# Patient Record
Sex: FEMALE | Employment: STUDENT | URBAN - METROPOLITAN AREA
[De-identification: names, ages, dates, MRNs, and addresses within clinical notes are randomized per-mention and may not be internally consistent; named-entity substitution may affect disease eponyms.]

---
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
## 2025-06-12 ENCOUNTER — OFFICE VISIT (OUTPATIENT)
Dept: FAMILY MEDICINE CLINIC | Facility: CLINIC | Age: 20
End: 2025-06-12
Payer: COMMERCIAL

## 2025-06-12 DIAGNOSIS — Z11.1 SCREENING FOR TUBERCULOSIS: Primary | ICD-10-CM

## 2025-06-12 LAB — INDURATION (): 0 MM (ref 0–11)

## 2025-06-12 PROCEDURE — 86580 TB INTRADERMAL TEST: CPT | Performed by: NURSE PRACTITIONER

## 2025-06-14 ENCOUNTER — OFFICE VISIT (OUTPATIENT)
Dept: FAMILY MEDICINE CLINIC | Facility: CLINIC | Age: 20
End: 2025-06-14
Payer: COMMERCIAL

## 2025-06-14 DIAGNOSIS — Z11.1 ENCOUNTER FOR PPD SKIN TEST READING: Primary | ICD-10-CM

## 2025-06-27 ENCOUNTER — OFFICE VISIT (OUTPATIENT)
Dept: FAMILY MEDICINE CLINIC | Facility: CLINIC | Age: 20
End: 2025-06-27
Payer: COMMERCIAL

## 2025-06-27 DIAGNOSIS — Z11.1 SCREENING FOR TUBERCULOSIS: Primary | ICD-10-CM

## 2025-06-27 LAB — INDURATION (): 0 MM (ref 0–11)

## 2025-06-27 PROCEDURE — 86580 TB INTRADERMAL TEST: CPT | Performed by: NURSE PRACTITIONER

## 2025-06-29 ENCOUNTER — OFFICE VISIT (OUTPATIENT)
Dept: FAMILY MEDICINE CLINIC | Facility: CLINIC | Age: 20
End: 2025-06-29
Payer: COMMERCIAL

## 2025-06-29 DIAGNOSIS — Z11.1 ENCOUNTER FOR PPD SKIN TEST READING: Primary | ICD-10-CM

## 2025-07-03 ENCOUNTER — HOSPITAL ENCOUNTER (EMERGENCY)
Age: 20
Discharge: HOME | End: 2025-07-04
Attending: EMERGENCY MEDICINE
Payer: COMMERCIAL

## 2025-07-03 ENCOUNTER — APPOINTMENT (OUTPATIENT)
Dept: CT IMAGING | Age: 20
End: 2025-07-03
Attending: EMERGENCY MEDICINE
Payer: COMMERCIAL

## 2025-07-03 ENCOUNTER — OFFICE VISIT (OUTPATIENT)
Dept: FAMILY MEDICINE CLINIC | Facility: CLINIC | Age: 20
End: 2025-07-03
Payer: COMMERCIAL

## 2025-07-03 VITALS
BODY MASS INDEX: 23.16 KG/M2 | SYSTOLIC BLOOD PRESSURE: 122 MMHG | WEIGHT: 118 LBS | HEIGHT: 60 IN | RESPIRATION RATE: 16 BRPM | TEMPERATURE: 98 F | DIASTOLIC BLOOD PRESSURE: 88 MMHG | HEART RATE: 91 BPM | OXYGEN SATURATION: 98 %

## 2025-07-03 DIAGNOSIS — R39.9 URINARY SYMPTOM OR SIGN: ICD-10-CM

## 2025-07-03 DIAGNOSIS — N83.209 OVARIAN CYST RUPTURE: Primary | ICD-10-CM

## 2025-07-03 DIAGNOSIS — N30.01 ACUTE CYSTITIS WITH HEMATURIA: Primary | ICD-10-CM

## 2025-07-03 LAB
ALBUMIN SERPL-MCNC: 4.7 G/DL (ref 3.2–4.8)
ALBUMIN/GLOB SERPL: 1.7 {RATIO} (ref 1–2)
ALP LIVER SERPL-CCNC: 98 U/L (ref 52–144)
ALT SERPL-CCNC: 11 U/L (ref 10–49)
ANION GAP SERPL CALC-SCNC: 7 MMOL/L (ref 0–18)
AST SERPL-CCNC: 15 U/L (ref ?–34)
BASOPHILS # BLD AUTO: 0.02 X10(3) UL (ref 0–0.2)
BASOPHILS NFR BLD AUTO: 0.3 %
BILIRUB SERPL-MCNC: 0.2 MG/DL (ref 0.3–1.2)
BILIRUB UR QL STRIP.AUTO: NEGATIVE
BILIRUBIN: NEGATIVE
BUN BLD-MCNC: 10 MG/DL (ref 9–23)
CALCIUM BLD-MCNC: 9.7 MG/DL (ref 8.7–10.6)
CHLORIDE SERPL-SCNC: 105 MMOL/L (ref 98–112)
CLARITY UR REFRACT.AUTO: CLEAR
CO2 SERPL-SCNC: 26 MMOL/L (ref 21–32)
COLOR UR AUTO: YELLOW
CREAT BLD-MCNC: 0.67 MG/DL (ref 0.55–1.02)
EGFRCR SERPLBLD CKD-EPI 2021: 128 ML/MIN/1.73M2 (ref 60–?)
EOSINOPHIL # BLD AUTO: 0.09 X10(3) UL (ref 0–0.7)
EOSINOPHIL NFR BLD AUTO: 1.2 %
ERYTHROCYTE [DISTWIDTH] IN BLOOD BY AUTOMATED COUNT: 12 %
GLOBULIN PLAS-MCNC: 2.8 G/DL (ref 2–3.5)
GLUCOSE (URINE DIPSTICK): NEGATIVE MG/DL
GLUCOSE BLD-MCNC: 86 MG/DL (ref 70–99)
GLUCOSE UR STRIP.AUTO-MCNC: NEGATIVE MG/DL
HCT VFR BLD AUTO: 34.2 % (ref 35–48)
HGB BLD-MCNC: 11.8 G/DL (ref 12–16)
IMM GRANULOCYTES # BLD AUTO: 0.04 X10(3) UL (ref 0–1)
IMM GRANULOCYTES NFR BLD: 0.5 %
KETONES (URINE DIPSTICK): NEGATIVE MG/DL
KETONES UR STRIP.AUTO-MCNC: NEGATIVE MG/DL
LYMPHOCYTES # BLD AUTO: 2.3 X10(3) UL (ref 1–4)
LYMPHOCYTES NFR BLD AUTO: 31 %
MCH RBC QN AUTO: 29.7 PG (ref 26–34)
MCHC RBC AUTO-ENTMCNC: 34.5 G/DL (ref 31–37)
MCV RBC AUTO: 86.1 FL (ref 80–100)
MONOCYTES # BLD AUTO: 0.72 X10(3) UL (ref 0.1–1)
MONOCYTES NFR BLD AUTO: 9.7 %
MULTISTIX LOT#: ABNORMAL NUMERIC
NEUTROPHILS # BLD AUTO: 4.24 X10 (3) UL (ref 1.5–7.7)
NEUTROPHILS # BLD AUTO: 4.24 X10(3) UL (ref 1.5–7.7)
NEUTROPHILS NFR BLD AUTO: 57.3 %
NITRITE UR QL STRIP.AUTO: NEGATIVE
NITRITE, URINE: NEGATIVE
OSMOLALITY SERPL CALC.SUM OF ELEC: 284 MOSM/KG (ref 275–295)
PH UR STRIP.AUTO: 7 [PH] (ref 5–8)
PH, URINE: 7 (ref 4.5–8)
PLATELET # BLD AUTO: 350 10(3)UL (ref 150–450)
POTASSIUM SERPL-SCNC: 4.4 MMOL/L (ref 3.5–5.1)
PROT SERPL-MCNC: 7.5 G/DL (ref 5.7–8.2)
PROT UR STRIP.AUTO-MCNC: NEGATIVE MG/DL
PROTEIN (URINE DIPSTICK): 30 MG/DL
RBC # BLD AUTO: 3.97 X10(6)UL (ref 3.8–5.3)
SODIUM SERPL-SCNC: 138 MMOL/L (ref 136–145)
SP GR UR STRIP.AUTO: 1.02 (ref 1–1.03)
SPECIFIC GRAVITY: 1.02 (ref 1–1.03)
URINE-COLOR: YELLOW
UROBILINOGEN UR STRIP.AUTO-MCNC: 0.2 MG/DL
UROBILINOGEN,SEMI-QN: 1 MG/DL (ref 0–1.9)
WBC # BLD AUTO: 7.4 X10(3) UL (ref 4–11)

## 2025-07-03 PROCEDURE — 99213 OFFICE O/P EST LOW 20 MIN: CPT | Performed by: NURSE PRACTITIONER

## 2025-07-03 PROCEDURE — 87086 URINE CULTURE/COLONY COUNT: CPT

## 2025-07-03 PROCEDURE — 87077 CULTURE AEROBIC IDENTIFY: CPT | Performed by: EMERGENCY MEDICINE

## 2025-07-03 PROCEDURE — 85025 COMPLETE CBC W/AUTO DIFF WBC: CPT

## 2025-07-03 PROCEDURE — 81001 URINALYSIS AUTO W/SCOPE: CPT

## 2025-07-03 PROCEDURE — 84702 CHORIONIC GONADOTROPIN TEST: CPT | Performed by: EMERGENCY MEDICINE

## 2025-07-03 PROCEDURE — 3074F SYST BP LT 130 MM HG: CPT | Performed by: NURSE PRACTITIONER

## 2025-07-03 PROCEDURE — 99284 EMERGENCY DEPT VISIT MOD MDM: CPT

## 2025-07-03 PROCEDURE — 96374 THER/PROPH/DIAG INJ IV PUSH: CPT

## 2025-07-03 PROCEDURE — 87086 URINE CULTURE/COLONY COUNT: CPT | Performed by: NURSE PRACTITIONER

## 2025-07-03 PROCEDURE — 87086 URINE CULTURE/COLONY COUNT: CPT | Performed by: EMERGENCY MEDICINE

## 2025-07-03 PROCEDURE — 3079F DIAST BP 80-89 MM HG: CPT | Performed by: NURSE PRACTITIONER

## 2025-07-03 PROCEDURE — 81003 URINALYSIS AUTO W/O SCOPE: CPT | Performed by: NURSE PRACTITIONER

## 2025-07-03 PROCEDURE — 81001 URINALYSIS AUTO W/SCOPE: CPT | Performed by: EMERGENCY MEDICINE

## 2025-07-03 PROCEDURE — 81015 MICROSCOPIC EXAM OF URINE: CPT

## 2025-07-03 PROCEDURE — 80053 COMPREHEN METABOLIC PANEL: CPT

## 2025-07-03 PROCEDURE — 85025 COMPLETE CBC W/AUTO DIFF WBC: CPT | Performed by: EMERGENCY MEDICINE

## 2025-07-03 PROCEDURE — 87077 CULTURE AEROBIC IDENTIFY: CPT | Performed by: NURSE PRACTITIONER

## 2025-07-03 PROCEDURE — 3008F BODY MASS INDEX DOCD: CPT | Performed by: NURSE PRACTITIONER

## 2025-07-03 PROCEDURE — 80053 COMPREHEN METABOLIC PANEL: CPT | Performed by: EMERGENCY MEDICINE

## 2025-07-03 RX ORDER — SULFAMETHOXAZOLE AND TRIMETHOPRIM 800; 160 MG/1; MG/1
1 TABLET ORAL 2 TIMES DAILY
Qty: 14 TABLET | Refills: 0 | Status: SHIPPED | OUTPATIENT
Start: 2025-07-03 | End: 2025-07-10

## 2025-07-03 RX ORDER — KETOROLAC TROMETHAMINE 15 MG/ML
15 INJECTION, SOLUTION INTRAMUSCULAR; INTRAVENOUS ONCE
Status: COMPLETED | OUTPATIENT
Start: 2025-07-03 | End: 2025-07-04

## 2025-07-03 RX ORDER — DEXTROAMPHETAMINE SACCHARATE, AMPHETAMINE ASPARTATE MONOHYDRATE, DEXTROAMPHETAMINE SULFATE AND AMPHETAMINE SULFATE 3.75; 3.75; 3.75; 3.75 MG/1; MG/1; MG/1; MG/1
CAPSULE, EXTENDED RELEASE ORAL
COMMUNITY
Start: 2025-07-03

## 2025-07-03 RX ORDER — SERTRALINE HYDROCHLORIDE 25 MG/1
TABLET, FILM COATED ORAL
COMMUNITY

## 2025-07-03 NOTE — PROGRESS NOTES
CHIEF COMPLAINT:     Chief Complaint   Patient presents with    Urinary Symptoms     C/o tingling sensation to vagina, urgency, low back pain, LLQ pain    Sx onset 1 wk        HPI:   Lance MEEHAN Parent is a 20 year old female who presents with symptoms of UTI. Patient reporting symptoms of dysuria for 1 weeks.  Symptoms have been worsening since onset.  Treatments tried: none.  Associated symptoms: mild left flank pain.  Patient reports flank pain.  Denies fever, hematuria, nausea, or vomiting. Patient denies genital discharge or itching. Patient denies new sexual partners in the last 3 months. Patient denies recent unprotected sexual intercourse.     Current Medications[1]   Past Medical History[2]   Social History:  Short Social Hx on File[3]      REVIEW OF SYSTEMS:   GENERAL: See above  GI: See HPI.    : See HPI.      EXAM:   /88   Pulse 91   Temp 98.4 °F (36.9 °C) (Oral)   Resp 16   Ht 5' (1.524 m)   Wt 118 lb (53.5 kg)   LMP 06/12/2025 (Approximate)   SpO2 98%   BMI 23.05 kg/m²   GENERAL: well developed, well nourished,in no apparent distress  CARDIO: RRR, no murmurs  LUNGS: clear to ausculation bilaterally, no wheezing or rhonchi  GI: BS present x 4.  No hepatosplenomegaly.  : denies suprapubic tenderness. No bladder distention, or CVAT     Recent Results (from the past 24 hours)   URINALYSIS, AUTO, W/O SCOPE    Collection Time: 07/03/25  6:39 PM   Result Value Ref Range    Glucose Urine Negative Negative mg/dL    Bilirubin Urine Negative Negative    Ketones, UA Negative Negative - Trace mg/dL    Spec Gravity 1.020 1.005 - 1.030    Blood Urine Trace-intact (A) Negative    PH Urine 7.0 5.0 - 8.0    Protein Urine 30 (A) Negative - Trace mg/dL    Urobilinogen Urine 1.0 0.2 - 1.0 mg/dL    Nitrite Urine Negative Negative    Leukocyte Esterase Urine Large (A) Negative    APPEARANCE Cloudy Clear    Color Urine Yellow Yellow    Multistix Lot# [id number] Numeric    Multistix Expiration Date 4/30/26 Date          ASSESSMENT AND PLAN:   Lance MEEHAN Parent is a 20 year old female presents with urinary symptoms.    ASSESSMENT:  Encounter Diagnoses   Name Primary?    Urinary symptom or sign     Acute cystitis with hematuria Yes       PLAN: Meds as listed below.  Comfort measures as described in Patient Instructions. will send urine culture.     Meds & Refills for this Visit:  Requested Prescriptions     Signed Prescriptions Disp Refills    sulfamethoxazole-trimethoprim -160 MG Oral Tab per tablet 14 tablet 0     Sig: Take 1 tablet by mouth 2 (two) times daily for 7 days.       Risk and benefits of medication discussed.   Stressed importance of completing full course of antibiotic unless told otherwise.     The patient indicates understanding of these issues and agrees to the plan.  The patient is asked to see PCP in 3 days if not better. Seek care immediately for new onset of fever, vomiting, worsening symptoms.    There are no Patient Instructions on file for this visit.             [1]   Current Outpatient Medications   Medication Sig Dispense Refill    sulfamethoxazole-trimethoprim -160 MG Oral Tab per tablet Take 1 tablet by mouth 2 (two) times daily for 7 days. 14 tablet 0    venlafaxine 75 MG Oral Capsule SR 24 Hr Take 1 capsule (75 mg total) by mouth daily. 30 capsule 0    hydrOXYzine 50 MG Oral Tab Take 1 tablet (50 mg total) by mouth nightly. 30 tablet 0    PROAIR  (90 Base) MCG/ACT Inhalation Aero Soln       FLOVENT HFA 44 MCG/ACT Inhalation Aerosol       Albuterol Sulfate HFA (PROAIR HFA) 108 (90 Base) MCG/ACT Inhalation Aero Soln Inhale 2 puffs into the lungs every 6 (six) hours as needed for Wheezing. 1 Inhaler 0   [2]   Past Medical History:   Asthma (HCC)    Fracture    Left wrist fx    Leg length discrepancy   [3]   Social History  Socioeconomic History    Marital status: Single   Tobacco Use    Smoking status: Never    Smokeless tobacco: Never   Vaping Use    Vaping status: Former     Substances: Nicotine    Devices: Disposable   Substance and Sexual Activity    Alcohol use: Not Currently     Comment: Last time was 2 weeks ago    Drug use: Not Currently     Types: Cannabis     Comment: Last time was 12/18/2021   Social History Narrative    ** Merged History Encounter **          Social Drivers of Health      Received from [facility]    Housing Stability

## 2025-07-04 ENCOUNTER — APPOINTMENT (OUTPATIENT)
Dept: CT IMAGING | Age: 20
End: 2025-07-04
Attending: EMERGENCY MEDICINE
Payer: COMMERCIAL

## 2025-07-04 VITALS
OXYGEN SATURATION: 100 % | SYSTOLIC BLOOD PRESSURE: 129 MMHG | HEART RATE: 82 BPM | BODY MASS INDEX: 19.66 KG/M2 | HEIGHT: 65 IN | TEMPERATURE: 98 F | DIASTOLIC BLOOD PRESSURE: 87 MMHG | WEIGHT: 118 LBS | RESPIRATION RATE: 16 BRPM

## 2025-07-04 LAB — B-HCG SERPL-ACNC: <2.6 MIU/ML (ref ?–4.2)

## 2025-07-04 PROCEDURE — 74177 CT ABD & PELVIS W/CONTRAST: CPT | Performed by: EMERGENCY MEDICINE

## 2025-07-04 RX ORDER — POLYETHYLENE GLYCOL 3350 17 G/17G
17 POWDER, FOR SOLUTION ORAL DAILY PRN
Qty: 12 EACH | Refills: 0 | Status: SHIPPED | OUTPATIENT
Start: 2025-07-04 | End: 2025-08-03

## 2025-07-04 RX ORDER — DOCUSATE SODIUM 100 MG/1
100 CAPSULE, LIQUID FILLED ORAL 2 TIMES DAILY
Qty: 60 CAPSULE | Refills: 0 | Status: SHIPPED | OUTPATIENT
Start: 2025-07-04 | End: 2025-08-03

## 2025-07-04 NOTE — ED PROVIDER NOTES
Patient Seen in: [facility] In [place]        History  Chief Complaint   Patient presents with    Abdomen/Flank Pain     Stated Complaint: abd pain    Subjective:   HPI              19 yo fm with c/o 7 days of symptoms abd pain back pain and dysuria.     Objective:     Past Medical History:    Asthma (HCC)    Fracture    Left wrist fx    Leg length discrepancy              Past Surgical History:   Procedure Laterality Date    Other surgical history Left 2018    leg length procedure                Social History     Socioeconomic History    Marital status: Single   Tobacco Use    Smoking status: Never    Smokeless tobacco: Never   Vaping Use    Vaping status: Former    Substances: Nicotine    Devices: Disposable   Substance and Sexual Activity    Alcohol use: Not Currently     Comment: Last time was 2 weeks ago    Drug use: Not Currently     Types: Cannabis     Comment: Last time was 12/18/2021   Social History Narrative    ** Merged History Encounter **          Social Drivers of Health      Received from [facility]    Housing Stability                                Physical Exam    ED Triage Vitals [07/03/25 2238]   BP (!) 149/96   Pulse 94   Resp 16   Temp 98.2 °F (36.8 °C)   Temp src Oral   SpO2 99 %   O2 Device None (Room air)       Current Vitals:   Vital Signs  BP: (!) 149/96  Pulse: 94  Resp: 16  Temp: 98.2 °F (36.8 °C)  Temp src: Oral    Oxygen Therapy  SpO2: 99 %  O2 Device: None (Room air)            Physical Exam  Vitals and nursing note reviewed.   Constitutional:       Appearance: She is well-developed.   HENT:      Head: Normocephalic and atraumatic.   Eyes:      Pupils: Pupils are equal, round, and reactive to light.   Cardiovascular:      Rate and Rhythm: Normal rate and regular rhythm.   Pulmonary:      Effort: Pulmonary effort is normal.      Breath sounds: Normal breath sounds.   Abdominal:      General: Bowel sounds are normal.      Palpations: Abdomen  is soft.   Musculoskeletal:         General: No deformity.      Cervical back: Normal range of motion and neck supple.   Skin:     General: Skin is warm and dry.      Capillary Refill: Capillary refill takes less than 2 seconds.   Neurological:      Mental Status: She is alert and oriented to person, place, and time.                 ED Course  Labs Reviewed   URINALYSIS WITH CULTURE REFLEX - Abnormal; Notable for the following components:       Result Value    Blood Urine Trace-lysed (*)     Leukocyte Esterase Urine Small (*)     WBC Urine 11-20 (*)     Bacteria Urine 1+ (*)     Squamous Epi. Cells Few (*)     All other components within normal limits   CBC WITH DIFFERENTIAL WITH PLATELET - Abnormal; Notable for the following components:    HGB 11.8 (*)     HCT 34.2 (*)     All other components within normal limits   COMP METABOLIC PANEL (14) - Abnormal; Notable for the following components:    Bilirubin, Total 0.2 (*)     All other components within normal limits   UA MICROSCOPIC ONLY, URINE - Abnormal; Notable for the following components:    WBC Urine 11-20 (*)     Bacteria Urine 1+ (*)     Squamous Epi. Cells Few (*)     All other components within normal limits   HCG, BETA SUBUNIT (QUANT PREGNANCY TEST) - Normal   URINE CULTURE, ROUTINE          CT ABDOMEN AND PELVIS WITH CONTRAST    Comparison: None      IMPRESSION:    Visualized portions of the appendix are unremarkable.    Gallbladder is unremarkable.  No evidence of biliary dilatation or acute pancreatic abnormality.    No acute renal abnormality is seen.    Small amount of free pelvic fluid.  1.8 cm collapsed appearing left ovarian cyst.  Consider ovarian cyst rupture.    No evidence of bowel obstruction.  No mass or adenopathy.    Large amount of stool within the proximal half of the colon.    Nonspecific bladder wall thickening.  Correlate for clinical evidence of cystitis.                    MDM     20-year-old female presents ED with complaints of  left-sided abdominal pain both signs with mild hypertension arrival for patient appears nontoxic examination previous vitals seen from yesterday and previous visits patient does not have blood pressure elevations that points likely as an acute reaction from her pain.  Patient has minimal tenderness in left lower quadrant rebound guarding or rigidity.  She also has a UTI per her urinalysis.  Basic labs obtained grossly benign.  Will obtain CT abdomen pelvis rule out diverticulitis versus colitis versus acute appendicitis versus pyelonephritis.  CT abdomen pelvis shows significant amount of stool left 1.8 cm collapsing appearing left ovarian cyst with free fluid in the pelvis.  Likely etiology of her pain.  Discussed supportive care continue oral antibiotics for her UTI strict return precautions instructed.  Patient shows understanding of plan and is in agreement plan discharged home stable condition        Medical Decision Making      Disposition and Plan     Clinical Impression:  1. Ovarian cyst rupture         Disposition:  Discharge  7/4/2025  2:28 am    Follow-up:  Stiven Mendoza MD  [address] [place] [id number]  [phone]    Go in 2 day(s)  As needed, If symptoms worsen          Medications Prescribed:  Current Discharge Medication List        START taking these medications    Details   polyethylene glycol, PEG 3350, 17 g Oral Powd Pack Take 17 g by mouth daily as needed.  Qty: 12 each, Refills: 0      docusate sodium 100 MG Oral Cap Take 1 capsule (100 mg total) by mouth 2 (two) times daily.  Qty: 60 capsule, Refills: 0                   Supplementary Documentation:

## 2025-07-04 NOTE — ED INITIAL ASSESSMENT (HPI)
Pt to ED with urinary frequency, dysuria x1 week, today with worsening lower back pain and left flank pain. +nausea. Pt started Bactrim DS today.

## 2025-07-04 NOTE — DISCHARGE INSTRUCTIONS
Continue antibiotics and finish the course.  Take ibuprofen alternate with Tylenol as needed for pain control.  Take docusate daily to help to soften your stools.  Take MiraLAX in the morning to help with your constipation.

## 2025-07-05 RX ORDER — CEPHALEXIN 500 MG/1
500 CAPSULE ORAL 4 TIMES DAILY
Qty: 20 CAPSULE | Refills: 0 | Status: SHIPPED | OUTPATIENT
Start: 2025-07-05 | End: 2025-07-10

## (undated) NOTE — ED AVS SNAPSHOT
Parent/Legal Guardian Access to the Online [employer] Record of a Patient 15to 16Years Old  Return completed form by Secure email to [facility]/Medical Records Department: [email or web address]     Requirements and Procedures   Under [facility] MyChart ID and password with another person, that person may be able to view my or my child’s health information, and health information about someone who has authorized me as a MyChart proxy.    ·  I agree that it is my responsibility to select a confident Sign-Up Form and I agree to its terms.        Authorization Form     Please enter Patient’s information below:   Name (last, first, middle initial) __________________________________________   Gender  Male  Female    Last 4 Digits of Social Security Number Parent/Legal Guardian Signature                                  For Patient (1517 years of age)  I agree to allow my parent/legal guardian, named above, online access to my medical information currently available and that may become available as a result

## (undated) NOTE — LETTER
February 4, 2017    Patient: Christine Quach Parent   Date of Visit: 2/4/2017       To Whom It May Concern:    Lance Montana was seen and treated in our emergency department on 2/4/2017. She return to gym when cleared by her doctor.      If you have any question

## (undated) NOTE — ED AVS SNAPSHOT
[facility] Emergency Department in [address] [place] [address]    Phone:  [phone]    Fax:  [phone] [facility]   MRN: [id number]    Department:  [facility] Emergency Department in [place]   Date of Visit: increasing pain take Motrin first if continued symptoms take then Tylenol with codeine    Discharge References/Attachments     WRIST FRACTURE (CHILD) (ENGLISH)    UPPER EXTREMITY FRACTURE (CHILD) (ENGLISH)      Disclosure     Insurance plans vary and the p CARE PHYSICIAN AT ONCE OR RETURN IMMEDIATELY TO THE EMERGENCY DEPARTMENT.     If you have been prescribed any medication(s), please fill your prescription right away and begin taking the medication(s) as directed    If the emergency physician has read X-ray coverage. Patient [address] is a Federal Navigator program that can help with your Affordable Care Act coverage, as well as all types of Medicaid plans.   To get signed up and covered, please call [phone] and ask to get set up for an insuran

## (undated) NOTE — ED AVS SNAPSHOT
THE [facility] Emergency Department in [address] [facility]    Phone:  [phone]    Fax:  [phone] [facility] Parent   MRN: [id number]    Department:  THE [facility] Emergency Department in [place]   Date of Visit: IF THERE IS ANY CHANGE OR WORSENING OF YOUR CONDITION, CALL YOUR PRIMARY CARE PHYSICIAN AT ONCE OR RETURN IMMEDIATELY TO THE EMERGENCY DEPARTMENT.     If you have been prescribed any medication(s), please fill your prescription right away and begin taking t

## (undated) NOTE — LETTER
Date & Time: 5/3/2021, 6:54 PM  Patient: Lorenzo Montana  Encounter Provider(s):    ESTER Velarde       To Whom It May Concern:    Lance Montana was seen and treated in our department on 5/3/2021. Patient has sustained a left wrist injury.   Patient

## (undated) NOTE — ED AVS SNAPSHOT
Parent/Legal Guardian Access to the Online [employer] Record of a Patient 15to 16Years Old  Return completed form by Secure email to [facility]/Medical Records Department: [email or web address].     Requirements and Procedures   Under [facility] MyChart ID and password with another person, that person may be able to view my or my child’s health information, and health information about someone who has authorized me as a MyChart proxy.    ·  I agree that it is my responsibility to select a confident Sign-Up Form and I agree to its terms.        Authorization Form     Please enter Patient’s information below:   Name (last, first, middle initial) __________________________________________   Gender  Male  Female    Last 4 Digits of Social Security Number Parent/Legal Guardian Signature                                  For Patient (1517 years of age)  I agree to allow my parent/legal guardian, named above, online access to my medical information currently available and that may become available as a result

## (undated) NOTE — LETTER
Date & Time: 8/14/2021, 11:48 AM  Patient: Jluis Montana  Encounter Provider(s):    Pito Knapp MD       To Whom It May Concern:    Lance Montana was seen and treated in our department on 8/14/2021.  She is not splinting any signs or symptoms of a concus